# Patient Record
Sex: MALE | Race: WHITE | NOT HISPANIC OR LATINO
[De-identification: names, ages, dates, MRNs, and addresses within clinical notes are randomized per-mention and may not be internally consistent; named-entity substitution may affect disease eponyms.]

---

## 2020-01-24 ENCOUNTER — APPOINTMENT (OUTPATIENT)
Dept: NEUROLOGY | Facility: CLINIC | Age: 75
End: 2020-01-24
Payer: MEDICARE

## 2020-01-24 VITALS
DIASTOLIC BLOOD PRESSURE: 65 MMHG | OXYGEN SATURATION: 98 % | HEIGHT: 72 IN | TEMPERATURE: 97.3 F | WEIGHT: 235 LBS | BODY MASS INDEX: 31.83 KG/M2 | SYSTOLIC BLOOD PRESSURE: 124 MMHG | HEART RATE: 68 BPM

## 2020-01-24 DIAGNOSIS — Z00.00 ENCOUNTER FOR GENERAL ADULT MEDICAL EXAMINATION W/OUT ABNORMAL FINDINGS: ICD-10-CM

## 2020-01-24 PROCEDURE — 99205 OFFICE O/P NEW HI 60 MIN: CPT

## 2020-01-29 NOTE — HISTORY OF PRESENT ILLNESS
[FreeTextEntry1] : The history is from the patient's sister Rianna\par Mr. Roberts is a 74-year-old man with a long-standing psychiatric diagnosis-?bipolar disorder, ?schizophrenia.  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 He had been on Seroquel for many years. This was stopped in June of 2019. In the end of July she first noticed dyskinesias – holli buccal lingual dyskinesias as well as generalized dyskinesias.  He is currently on no antipsychotic medication.\par

## 2020-01-29 NOTE — PHYSICAL EXAM
[FreeTextEntry1] : Physical examination \par General: No acute distress, Awake, Alert.   \par Fundus: disc margins sharp.   \par Neck: no Carotid bruit.   \par Cardiovascular: Normal rate, Regular rhythm, No murmur.  \par \par \par Mental status \par Awake, alert, oriented to “Dr. office”, 2019, June.  Pres - he says Trump when I say German.  Memory is 0/3.\par \par \par Cranial Nerves \par II: VFF  \par III, IV, VI: PERRL, EOMI.   \par V: Facial sensation is normal B/L.   \par VII: Facial strength is normal B/L. \par VIII: Decreased hearing, bilaterally. \par IX, X: Palate is midline and elevates symmetrically.   Mild dysarthria.\par XI: Trapezius normal strength.   \par XII: Tongue midline without atrophy or fasciculations. \par \par Motor exam  \par Severe increased tone on the right side. Rigidity on the left side.\par Morteza buccal lingual dyskinesias as well as generalized dyskinesias\par No atrophy or fasciculations \par He is able to move and hold up his left greater than right arms. He is able to lift his legs. He is in a wheelchair.\par Muscle Strength: arms and legs, proximal and distal flexors and extensors are normal \par \par \par Reflexes \par Arms 1\par Legs 0\par Plantars right: w/d\par Plantars left: w/d\par    \par \par Coordination \par Slow, no ataxia - FNF, YOLIE, HKS. \par \par Sensory \par ?reliable\par \par \par Gait \par W/C bound.\par \par

## 2020-01-29 NOTE — REVIEW OF SYSTEMS
[Feeling Poorly] : feeling poorly [Feeling Tired] : feeling tired [Recent Weight Loss (___ Lbs)] : recent [unfilled] ~Ulb weight loss [Difficulties in Speech] : speech difficulties [Difficulty Walking] : difficulty walking [Inability to Walk] : inability to walk [Palpitations] : palpitations [Shortness Of Breath] : shortness of breath [Limb Pain] : limb pain [Feelings Of Weakness] : feelings of weakness [Negative] : Heme/Lymph [FreeTextEntry5] : Trouble breathing [FreeTextEntry6] : t [de-identified] : ulcer [FreeTextEntry9] : back pain

## 2020-01-29 NOTE — CONSULT LETTER
[Dear  ___] : Dear  [unfilled], [FreeTextEntry1] : I had the pleasure of evaluating your patient, EVERARDO THORNTON. Please see the assessment section below for a summary of my diagnostic impression and plan.\par \par Thank you very much for allowing me to participate in the care of this patient. If you have any questions, please do not hesitate to contact me. \par \par Sincerely,\par \par Carole Lamb MD\par

## 2020-01-29 NOTE — REASON FOR VISIT
[Consultation] : a consultation visit [Family Member] : family member [Other: _____] : [unfilled] [FreeTextEntry1] : Patient is experiencing symptoms of Tardive Dyskinesia. Patient has trouble with speech and choking on food.

## 2020-07-28 ENCOUNTER — APPOINTMENT (OUTPATIENT)
Dept: NEUROLOGY | Facility: CLINIC | Age: 75
End: 2020-07-28
Payer: MEDICARE

## 2020-07-28 PROCEDURE — 99212 OFFICE O/P EST SF 10 MIN: CPT | Mod: 95

## 2020-07-28 NOTE — HISTORY OF PRESENT ILLNESS
[FreeTextEntry1] : History obtained from patient and Alba MONTENEGRO.\par Mr. Roberts is a 75 year old man with history of psychiatric disease and stroke in 2019 with residual right hemiparesis and speech difficulty. He presents today for a follow up for tardive dyskinesia.\par \par Alba his nurse reports his abnormal movements have improved since starting Tetrabenazine - oral and generalized movements have improved.  He is currently taking 25mg in the morning and 12.5mg in the afternoon and evening.  His appetite has improved as well as his physical activity.   He is better able to eat and move with the decrease in the tardive dyskinesias.\par \par Overall, he is doing well with no new neurological complaints.\par \par The remaining neurological review of systems is negative. \par \par 1/24/20: The history is from the patient's sister Rianna\par Mr. Roberts is a 74-year-old man with a long-standing psychiatric diagnosis-?bipolar disorder, ?schizophrenia.  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 He had been on Seroquel for many years. This was stopped in June of 2019. In the end of July she first noticed dyskinesias – holli buccal lingual dyskinesias as well as generalized dyskinesias.  He is currently on no antipsychotic medication.\par

## 2020-07-28 NOTE — REASON FOR VISIT
[Home] : at home, [unfilled] , at the time of the visit. [Medical Office: ([facility])___] : at the medical office located in  [Other:____] : [unfilled] [Verbal consent obtained from patient] : the patient, [unfilled]

## 2020-07-28 NOTE — ASSESSMENT
[FreeTextEntry1] : Mr. Roberts is a 75-year-old man with tardive dyskinesias with resolution with tetrabenazine.\par \par Continue Tetrabenazine 25/12.5/12.5\par \par Follow up one year.

## 2020-07-28 NOTE — PHYSICAL EXAM
[FreeTextEntry1] : Exam limited due to telehealth. \par Physical examination \par General: No acute distress, Awake, Alert.   \par No abnormal movements noted. \par \par \par 1/8/20 Physical examination \par General: No acute distress, Awake, Alert.   \par Fundus: disc margins sharp.   \par Neck: no Carotid bruit.   \par Cardiovascular: Normal rate, Regular rhythm, No murmur.  \par \par \par Mental status \par Awake, alert, oriented to “Dr. office”, 2019, June.  Pres - he says Jaida when I say German.  Memory is 0/3.\par \par \par Cranial Nerves \par II: VFF  \par III, IV, VI: PERRL, EOMI.   \par V: Facial sensation is normal B/L.   \par VII: Facial strength is normal B/L. \par VIII: Decreased hearing, bilaterally. \par IX, X: Palate is midline and elevates symmetrically.   Mild dysarthria.\par XI: Trapezius normal strength.   \par XII: Tongue midline without atrophy or fasciculations. \par \par Motor exam  \par Severe increased tone on the right side. Rigidity on the left side.\par Morteza buccal lingual dyskinesias as well as generalized dyskinesias\par No atrophy or fasciculations \par He is able to move and hold up his left greater than right arms. He is able to lift his legs. He is in a wheelchair.\par Muscle Strength: arms and legs, proximal and distal flexors and extensors are normal \par \par \par Reflexes \par Arms 1\par Legs 0\par Plantars right: w/d\par Plantars left: w/d\par    \par \par Coordination \par Slow, no ataxia - FNF, YOLIE, HKS. \par \par Sensory \par ?reliable\par \par \par Gait \par W/C bound.\par \par

## 2021-05-03 ENCOUNTER — APPOINTMENT (OUTPATIENT)
Dept: NEUROLOGY | Facility: CLINIC | Age: 76
End: 2021-05-03

## 2021-05-11 ENCOUNTER — APPOINTMENT (OUTPATIENT)
Dept: NEUROLOGY | Facility: CLINIC | Age: 76
End: 2021-05-11
Payer: MEDICARE

## 2021-05-11 VITALS
SYSTOLIC BLOOD PRESSURE: 144 MMHG | TEMPERATURE: 97.2 F | WEIGHT: 205 LBS | HEART RATE: 50 BPM | DIASTOLIC BLOOD PRESSURE: 77 MMHG | BODY MASS INDEX: 27.77 KG/M2 | HEIGHT: 72 IN

## 2021-05-11 DIAGNOSIS — G24.01 DRUG INDUCED SUBACUTE DYSKINESIA: ICD-10-CM

## 2021-05-11 PROCEDURE — 99213 OFFICE O/P EST LOW 20 MIN: CPT

## 2021-05-12 NOTE — HISTORY OF PRESENT ILLNESS
[FreeTextEntry1] : History obtained from patient.\par Mr. Roberts is a 76 year old man with history of psychiatric disease and stroke in 2019 with residual right hemiparesis and speech difficulty. He presents today for a follow up for tardive dyskinesia.\par \par He denies any difficulty swallowing or worsening of his movements. Pt seen and examined in stretcher. \par \par The remaining neurological review of systems is negative. \par \par 7/28/20\par History obtained from patient and Alba MONTENEGRO.\par Mr. Roberts is a 75 year old man with history of psychiatric disease and stroke in 2019 with residual right hemiparesis and speech difficulty. He presents today for a follow up for tardive dyskinesia.\par \par Alba his nurse reports his abnormal movements have improved since starting Tetrabenazine - oral and generalized movements have improved.  He is currently taking 25mg in the morning and 12.5mg in the afternoon and evening.  His appetite has improved as well as his physical activity.   He is better able to eat and move with the decrease in the tardive dyskinesias.\par \par Overall, he is doing well with no new neurological complaints.\par \par The remaining neurological review of systems is negative. \par \par 1/24/20: The history is from the patient's sister Rianna\par Mr. Roberts is a 74-year-old man with a long-standing psychiatric diagnosis-?bipolar disorder, ?schizophrenia.  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 He had been on Seroquel for many years. This was stopped in June of 2019. In the end of July she first noticed dyskinesias – holli buccal lingual dyskinesias as well as generalized dyskinesias.  He is currently on no antipsychotic medication.\par

## 2021-05-12 NOTE — CONSULT LETTER
[Dear  ___] : Dear  [unfilled], [FreeTextEntry1] : I had the pleasure of evaluating your patient, EVERARDO THORNTON. Please see the assessment section below for a summary of my diagnostic impression and plan.\par \par Thank you very much for allowing me to participate in the care of this patient. If you have any questions, please do not hesitate to contact me. \par \par Sincerely,\par \par Caroel Lamb MD\par JAQUELIN ReidP.\par

## 2021-05-12 NOTE — PHYSICAL EXAM
[FreeTextEntry1] :   \par Physical examination \par General: No acute distress, Awake, Alert.   \par \par Mental status \par Awake, alert, oriented to name, disoriented to place and time "March"\par 4Q1$\par \par \par Cranial Nerves \par II: VFF  \par III, IV, VI: PERRL, EOMI.   \par VII: Facial strength is normal B/L. \par VIII: Decreased hearing, bilaterally. \par IX, X: Palate is midline and elevates symmetrically.   Mild dysarthria.\par XI: Trapezius normal strength.   \par XII: Tongue midline without atrophy or fasciculations. \par \par Motor exam  \par cogwheel and lead pipe rigidity arms. \par Severe increased tone on the right side. Rigidity on the left side.\par Morteza buccal lingual dyskinesias as well as generalized dyskinesias\par No atrophy or fasciculations \par He is able to move and hold up his left greater than right arms. He is able to lift his legs. He is in a stretcher\par Muscle Strength: arms and legs, proximal and distal flexors and extensors are normal \par \par  \par    \par \par Coordination \par Slow, no ataxia - FNF, YOLIE, HKS. \par \par  \par \par \par Gait \par W/C bound.\par \par

## 2021-05-12 NOTE — ASSESSMENT
[FreeTextEntry1] : Mr. Roberts is a 76-year-old man with tardive dyskinesias with improvement with tetrabenazine.\par \par Continue Tetrabenazine 25/12.5/12.5\par \par Follow up one year. \par \par \par